# Patient Record
Sex: FEMALE | Race: OTHER | Employment: UNEMPLOYED | URBAN - METROPOLITAN AREA
[De-identification: names, ages, dates, MRNs, and addresses within clinical notes are randomized per-mention and may not be internally consistent; named-entity substitution may affect disease eponyms.]

---

## 2023-11-24 ENCOUNTER — HOSPITAL ENCOUNTER (OUTPATIENT)
Age: 43
Discharge: HOME | End: 2023-11-24
Attending: EMERGENCY MEDICINE
Payer: COMMERCIAL

## 2023-11-24 VITALS
WEIGHT: 136.69 LBS | DIASTOLIC BLOOD PRESSURE: 76 MMHG | SYSTOLIC BLOOD PRESSURE: 123 MMHG | RESPIRATION RATE: 18 BRPM | OXYGEN SATURATION: 100 % | TEMPERATURE: 97 F | HEART RATE: 80 BPM

## 2023-11-24 DIAGNOSIS — J06.9 VIRAL URI: Primary | ICD-10-CM

## 2023-11-24 DIAGNOSIS — H10.31 ACUTE BACTERIAL CONJUNCTIVITIS OF RIGHT EYE: ICD-10-CM

## 2023-11-24 PROCEDURE — 99203 OFFICE O/P NEW LOW 30 MIN: CPT

## 2023-11-24 RX ORDER — POLYMYXIN B SULFATE AND TRIMETHOPRIM 1; 10000 MG/ML; [USP'U]/ML
1 SOLUTION OPHTHALMIC
Qty: 10 ML | Refills: 0 | Status: SHIPPED | OUTPATIENT
Start: 2023-11-24 | End: 2023-11-29

## 2023-11-24 NOTE — DISCHARGE INSTRUCTIONS
Follow-up with your primary care doctor  Use Polytrim eyedrops every 3 hours for 5 days  Drink plenty of fluids for hydration  Take Tylenol or Motrin as needed for any fever  Return if any worsening symptoms or new concerns

## 2024-06-06 ENCOUNTER — HOSPITAL ENCOUNTER (OUTPATIENT)
Age: 44
Discharge: HOME | End: 2024-06-06
Attending: EMERGENCY MEDICINE
Payer: COMMERCIAL

## 2024-06-06 VITALS
HEART RATE: 62 BPM | BODY MASS INDEX: 25.52 KG/M2 | SYSTOLIC BLOOD PRESSURE: 121 MMHG | TEMPERATURE: 98 F | DIASTOLIC BLOOD PRESSURE: 53 MMHG | WEIGHT: 130 LBS | HEIGHT: 60 IN | OXYGEN SATURATION: 100 % | RESPIRATION RATE: 18 BRPM

## 2024-06-06 DIAGNOSIS — K13.0 RASH ON LIPS: Primary | ICD-10-CM

## 2024-06-06 PROBLEM — B00.1 HERPES LABIALIS: Status: ACTIVE | Noted: 2023-10-25

## 2024-06-06 PROBLEM — O26.10: Status: ACTIVE | Noted: 2023-03-22

## 2024-06-06 PROBLEM — E05.90 HYPERTHYROIDISM: Status: ACTIVE | Noted: 2023-11-12

## 2024-06-06 PROCEDURE — 99213 OFFICE O/P EST LOW 20 MIN: CPT

## 2024-06-06 PROCEDURE — 99214 OFFICE O/P EST MOD 30 MIN: CPT

## 2024-06-06 RX ORDER — PREDNISONE 20 MG/1
40 TABLET ORAL DAILY
Qty: 6 TABLET | Refills: 0 | Status: SHIPPED | OUTPATIENT
Start: 2024-06-06 | End: 2024-06-09

## 2024-06-06 NOTE — DISCHARGE INSTRUCTIONS
Over-the-counter hydrocortisone strength to apply on the lips twice a day.  If you have triamcinolone ointment at home or hydrocortisone ointment at home, okay to mix this with Aquaphor in a one-to-one ratio and apply to the lips.  If this does not help after 2 to 3 days, consider oral prednisone.  Consider using an oral antihistamine-Benadryl at night, Allegra, Zyrtec, Claritin during the day.  Call your dermatologist to see if you can get in sooner.  Return for any problems

## 2024-06-06 NOTE — ED INITIAL ASSESSMENT (HPI)
Pt here w/ 3 weeks of lips becoming scaly, irritated and dry. Burning sensation to them. Borders dry and peeling like appearance. Using aquaphor lip balm w/o relief. Developed a cold sore that she treated w/ success.

## 2024-06-06 NOTE — ED PROVIDER NOTES
Patient Seen in: [facility]      History     Chief Complaint   Patient presents with    Allergies     Redness, dryness, pain, peeling on the border of the lips for 3 weeks. Got worse the last couple of days - Entered by patient    Rash Skin Problem     Stated Complaint: Allergies - Redness, dryness, pain, peeling on the border of the lips for 3 wee*    Subjective:   HPI    Patient has been noticing irritation of the legs, especially at the vermilion border over the past 3 weeks.  She had a cold sore to the area.  Patient has long-term eczema which has also been flaring recently.  She has been using Aquaphor, was not sure what she can use for the face.  No other systemic issues.  No joint issues no other significant rash except for her usual eczema.  No unexplained weight loss no weight night sweats no other problems or complaints    Objective:   History reviewed. No pertinent past medical history.           History reviewed. No pertinent surgical history.             Social History     Socioeconomic History    Marital status:    Tobacco Use    Smoking status: Never    Smokeless tobacco: Never     Social Determinants of Health      Received from [facility]              Review of Systems    Positive for stated complaint: Allergies - Redness, dryness, pain, peeling on the border of the lips for 3 wee*  Other systems are as noted in HPI.  Constitutional and vital signs reviewed.      All other systems reviewed and negative except as noted above.    Physical Exam     ED Triage Vitals [06/06/24 1405]   /53   Pulse 62   Resp 18   Temp 97.7 °F (36.5 °C)   Temp src Temporal   SpO2 100 %   O2 Device None (Room air)       Current Vitals:   Vital Signs  BP: 121/53  Pulse: 62  Resp: 18  Temp: 97.7 °F (36.5 °C)  Temp src: Temporal    Oxygen Therapy  SpO2: 100 %  O2 Device: None (Room air)            Physical Exam    General: Well-developed, well-nourished, comfortable, no acute  distress  Head and neck: Normocephalic, atraumatic, erythema, mild thickening at the vermilion border, no internal lesions.  On small ulcer 2 to 3 mm left upper border  Extremities: No cyanosis, no clubbing, no edema   Musculoskeletal: No tenderness, swelling, deformity   Skin: Chronic and acute eczema changes  Neuro: Grossly intact to patient's baseline    ED Course   Labs Reviewed - No data to display                   MDM      Patient with long-term eczema now here with irritation at the vermilion border of lips, also had a cold sore recently, and flare of her usual eczema.  She was not sure what she can apply on the face and comes in.  Patient does have hydrocortisone ointment and triamcinolone cream at home.  We discussed possibly mixing the hydrocortisone ointment with Aquaphor to reduce the strength so that it is appropriate for facial use.  If this does not help over the next 2 to 3 days, patient will use oral prednisone.  She does have dermatology follow-up set up in the next few weeks.  Aftercare instructions reviewed all questions answered.  She appears nontoxic and well otherwise.                                   Medical Decision Making      Disposition and Plan     Clinical Impression:  1. Rash on lips         Disposition:  Discharge  6/6/2024  2:39 pm    Follow-up:  Rissa Martinez MD  [address] [address]  [phone]                Medications Prescribed:  Discharge Medication List as of 6/6/2024  2:42 PM        START taking these medications    Details   predniSONE 20 MG Oral Tab Take 2 tablets (40 mg total) by mouth daily for 3 days., Normal, Disp-6 tablet, R-0